# Patient Record
Sex: MALE | Race: WHITE
[De-identification: names, ages, dates, MRNs, and addresses within clinical notes are randomized per-mention and may not be internally consistent; named-entity substitution may affect disease eponyms.]

---

## 2018-10-27 ENCOUNTER — HOSPITAL ENCOUNTER (EMERGENCY)
Dept: HOSPITAL 12 - ER | Age: 23
Discharge: HOME | End: 2018-10-27
Payer: COMMERCIAL

## 2018-10-27 VITALS — BODY MASS INDEX: 29.8 KG/M2 | WEIGHT: 220 LBS | HEIGHT: 72 IN

## 2018-10-27 DIAGNOSIS — F07.81: ICD-10-CM

## 2018-10-27 DIAGNOSIS — S16.1XXA: Primary | ICD-10-CM
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
## 2018-10-27 PROCEDURE — A4663 DIALYSIS BLOOD PRESSURE CUFF: HCPCS

## 2018-10-27 NOTE — NUR
Patient discharged to home in stable conditon.  Written and verbal after care 
instructions given to patient. Patient verbalizes understanding of 
instructions. Patient left ER with brisk steady gait.